# Patient Record
Sex: MALE | URBAN - METROPOLITAN AREA
[De-identification: names, ages, dates, MRNs, and addresses within clinical notes are randomized per-mention and may not be internally consistent; named-entity substitution may affect disease eponyms.]

---

## 2019-02-02 ENCOUNTER — ANCILLARY PROCEDURE (OUTPATIENT)
Dept: GENERAL RADIOLOGY | Facility: CLINIC | Age: 64
End: 2019-02-02
Payer: MEDICAID

## 2019-02-02 ENCOUNTER — OFFICE VISIT (OUTPATIENT)
Dept: URGENT CARE | Facility: URGENT CARE | Age: 64
End: 2019-02-02
Payer: MEDICAID

## 2019-02-02 VITALS
SYSTOLIC BLOOD PRESSURE: 170 MMHG | TEMPERATURE: 96.7 F | OXYGEN SATURATION: 97 % | RESPIRATION RATE: 16 BRPM | DIASTOLIC BLOOD PRESSURE: 109 MMHG | HEART RATE: 76 BPM | WEIGHT: 220 LBS

## 2019-02-02 DIAGNOSIS — S22.42XA CLOSED FRACTURE OF MULTIPLE RIBS OF LEFT SIDE, INITIAL ENCOUNTER: Primary | ICD-10-CM

## 2019-02-02 DIAGNOSIS — R07.81 RIB PAIN ON LEFT SIDE: ICD-10-CM

## 2019-02-02 PROCEDURE — 71101 X-RAY EXAM UNILAT RIBS/CHEST: CPT | Mod: LT

## 2019-02-02 PROCEDURE — 99203 OFFICE O/P NEW LOW 30 MIN: CPT | Performed by: FAMILY MEDICINE

## 2019-02-02 RX ORDER — IBUPROFEN 200 MG
200 TABLET ORAL EVERY 4 HOURS PRN
COMMUNITY

## 2019-02-02 RX ORDER — OXYCODONE AND ACETAMINOPHEN 5; 325 MG/1; MG/1
1 TABLET ORAL EVERY 6 HOURS PRN
Qty: 30 TABLET | Refills: 0 | Status: SHIPPED | OUTPATIENT
Start: 2019-02-02 | End: 2019-02-12

## 2019-02-02 ASSESSMENT — PAIN SCALES - GENERAL: PAINLEVEL: WORST PAIN (10)

## 2019-02-02 NOTE — PROGRESS NOTES
SUBJECTIVE:   Madhu Lopez is a 64 year old male who presents to clinic today for the following health issues:    HPI     Presents for eval today after falling off a tractor Monday and catching LEFT side of ribs on edge of the tractor bucket seat.  Pain in this area continues to worsen-- he is having a hard time taking a deep breath due to the pain.  Requests xray- daughter is MD in [place] and wants to look at the xrays.    Problem list and histories reviewed & adjusted, as indicated.  Additional history: as documented        There is no problem list on file for this patient.    No past surgical history on file.    Social History     Tobacco Use     Smoking status: Never Smoker     Smokeless tobacco: Never Used   Substance Use Topics     Alcohol use: Not on file     No family history on file.      Current Outpatient Medications   Medication Sig Dispense Refill     ibuprofen (ADVIL/MOTRIN) 200 MG tablet Take 200 mg by mouth every 4 hours as needed for mild pain       No Known Allergies  No lab results found.   BP Readings from Last 3 Encounters:   02/02/19 (!) 170/109    Wt Readings from Last 3 Encounters:   02/02/19 99.8 kg (220 lb)                 ROS:  Constitutional, HEENT, cardiovascular, pulmonary, gi and gu systems are negative, except as otherwise noted.    OBJECTIVE:     BP (!) 170/109   Pulse 76   Temp 96.7  F (35.9  C) (Oral)   Resp 16   Wt 99.8 kg (220 lb)   SpO2 97%   There is no height or weight on file to calculate BMI.  GENERAL: healthy, alert, in moderate distress  EYES: Eyes grossly normal to inspection, PERRL and conjunctivae and sclerae normal  NECK: no adenopathy, no asymmetry, masses, or scars and thyroid normal to palpation  RESP: lungs clear to auscultation - no rales, rhonchi or wheezes, hard to take deep breath with pain on LEFT lateral aspect of lower ribcage, faint bruise noted in area patient points out as most painful  MS: no gross musculoskeletal defects noted, no edema  SKIN:  no suspicious lesions or rashes  NEURO: Normal strength and tone, mentation intact and speech normal  PSYCH: mentation appears normal, affect normal/bright    Diagnostic Test Results:  Xray - + rib fractures in LEFT lateral ribcage T10-11    ASSESSMENT/PLAN:     1. Closed fracture of multiple ribs of left side, initial encounter    - XR Ribs & Chest Left G/E 3 Views; Future  - oxyCODONE-acetaminophen (PERCOCET) 5-325 MG tablet; Take 1 tablet by mouth every 6 hours as needed for pain  Dispense: 30 tablet; Refill: 0    Recommend NSAIDs during the day for pain with Percocet prn breakthrough pain and to sleep.  Discussed bracing area with small pillow when coughs or needs to take deep breath to minimize flail of chest and pain.    Follow-up with PCP if no change or worsening symptoms prn.    Madeline Yeh MD  [facility]